# Patient Record
Sex: FEMALE | Race: BLACK OR AFRICAN AMERICAN | Employment: UNEMPLOYED | URBAN - METROPOLITAN AREA
[De-identification: names, ages, dates, MRNs, and addresses within clinical notes are randomized per-mention and may not be internally consistent; named-entity substitution may affect disease eponyms.]

---

## 2020-08-17 ENCOUNTER — VIRTUAL VISIT (OUTPATIENT)
Dept: FAMILY MEDICINE CLINIC | Age: 30
End: 2020-08-17
Payer: MEDICAID

## 2020-08-17 DIAGNOSIS — U07.1 COVID-19: Primary | ICD-10-CM

## 2020-08-17 PROCEDURE — 99442 PR PHYS/QHP TELEPHONE EVALUATION 11-20 MIN: CPT | Performed by: FAMILY MEDICINE

## 2020-08-17 RX ORDER — APIXABAN 2.5 MG/1
TABLET, FILM COATED ORAL
COMMUNITY
Start: 2020-08-02

## 2020-08-17 NOTE — PROGRESS NOTES
Patient stated name &   Chief Complaint   Patient presents with    New Patient     Covid patient        [facility] H/O f/u      Discharged 20     Treatment - Zinc, Vit C and a steroid        Health Maintenance Due   Topic    Pneumococcal 0-64 years (1 of 1 - PPSV23)    PAP AKA CERVICAL CYTOLOGY     Influenza Age 5 to Adult        Wt Readings from Last 3 Encounters:   14 313 lb (142 kg)   12 280 lb (127 kg)   12 283 lb (128.4 kg)     Temp Readings from Last 3 Encounters:   12 98.7 °F (37.1 °C)   12 98.8 °F (37.1 °C) (Oral)   10/12/11 99.2 °F (37.3 °C) (Oral)     BP Readings from Last 3 Encounters:   14 136/82   12 139/84   12 133/76     Pulse Readings from Last 3 Encounters:   14 72   12 90   12 94         Learning Assessment:  :     No flowsheet data found. Depression Screening:  :     No flowsheet data found. Fall Risk Assessment:  :     No flowsheet data found. Abuse Screening:  :     No flowsheet data found. Coordination of Care Questionnaire:  :     1) Have you been to an emergency room, urgent care clinic since your last visit? No    Hospitalized since your last visit? Yes  [facility] H/O  Positive Covid             2) Have you seen or consulted any other health care providers outside of [address] [place] [address] since your last visit? No  (Include any pap smears or colon screenings in this section.)  no    Patient is accompanied by VV I have received verbal consent from Nohemy Vega to discuss any/all medical information while they are present in the room.

## 2020-08-17 NOTE — PROGRESS NOTES
Nohemy Vega is a 26 y.o. female evaluated via telephone on 8/17/2020. Consent:  She and/or health care decision maker is aware that she may receive a bill for this telephone service, depending on her insurance coverage, and has provided verbal consent to proceed: Yes    Unable to contact patient for scheduled video visit so entire visit done on phone instead    Documentation:  I communicated with the patient and/or health care decision maker about recent hospitalization for COVID. Details of this discussion including any medical advice provided:     Hospitalized at [address] [place] [address]   4 days  DC August 2  Steroids, plasma, zinc and vitamin C used  Remains on Eliquis has one month supply  Advised may stop with onset of menses  No fever, dyspnea or other sx now  Had O2 but only needed for one day. Assessment and Plan:    1. COVID-19  Enfora . May stop with menses. Watch for hematuria or BRBPR  Bleeding risk discussed. FU if new respiratory sx otherwise in clinic PRN      Follow-up and Dispositions    · Return in about 1 month (around 9/17/2020) for COVID follow up in clinic. I affirm this is a Patient Initiated Episode with a Patient who has not had a related appointment within my department in the past 7 days or scheduled within the next 24 hours. Total Time: minutes: 11-20 minutes    Note: not billable if this call serves to triage the patient into an appointment for the relevant concern    The patient was at home and I was in office for this phone visit.     Ilana Guillaume MD

## 2022-01-06 ENCOUNTER — NURSE TRIAGE (OUTPATIENT)
Dept: OTHER | Facility: CLINIC | Age: 32
End: 2022-01-06

## 2022-01-06 NOTE — TELEPHONE ENCOUNTER
Received call from Crow at [facility] with Red Flag Complaint. Subjective: Caller states \"heart palpitations seem cardiologist years ago and the cardiologist said pts heart was fine\"     Current Symptoms: palpitations    Onset: this morning 6:30 am    Associated Symptoms: NA    Pain Severity: no pain    Temperature: no temp    What has been tried: resting, laying down, staying calm    LMP: 4 days ago Pregnant: No    Recommended disposition: to to see PCP today - pt is not established pt going to ER today. Transferred pt to  to get established appt. Care advice provided, patient verbalizes understanding; denies any other questions or concerns; instructed to call back for any new or worsening symptoms. Writer provided warm transfer to message to Leslee Rizo at [facility] for appointment scheduling    Attention Provider: Thank you for allowing me to participate in the care of your patient. The patient was connected to triage in response to information provided to the ECC. Please do not respond through this encounter as the response is not directed to a shared pool.     Reminders:     Call [address] Provider/Office    Care Advice - both instruction and documentation    Routing - PCP     PLEASE DELETE ALL RED TEXT PRIOR TO SIGNING NOTE    Reason for Disposition   Skipped or extra beat(s) and increases with exercise or exertion    Protocols used: HEART RATE AND HEARTBEAT QUESTIONS-ADULT-[place]

## 2024-03-04 ENCOUNTER — OFFICE VISIT (OUTPATIENT)
Age: 34
End: 2024-03-04
Payer: MEDICAID

## 2024-03-04 VITALS
HEIGHT: 72 IN | WEIGHT: 293 LBS | HEART RATE: 81 BPM | BODY MASS INDEX: 39.68 KG/M2 | SYSTOLIC BLOOD PRESSURE: 136 MMHG | DIASTOLIC BLOOD PRESSURE: 81 MMHG

## 2024-03-04 DIAGNOSIS — E05.90 SUBCLINICAL HYPERTHYROIDISM: Primary | ICD-10-CM

## 2024-03-04 DIAGNOSIS — E04.2 MULTIPLE THYROID NODULES: ICD-10-CM

## 2024-03-04 PROCEDURE — 99204 OFFICE O/P NEW MOD 45 MIN: CPT | Performed by: INTERNAL MEDICINE

## 2024-03-04 PROCEDURE — 76536 US EXAM OF HEAD AND NECK: CPT | Performed by: INTERNAL MEDICINE

## 2024-03-04 PROCEDURE — G2211 COMPLEX E/M VISIT ADD ON: HCPCS | Performed by: INTERNAL MEDICINE

## 2024-03-04 NOTE — PROGRESS NOTES
Chief Complaint   Patient presents with    Thyroid Problem       * New Patient Visit     General:  [facility]ch[facility] - at [facility]  Labs were elevated   This was a couple months ago    Did see someone at  in past and had FNA - many many years ago   Maybe had a scan?    Family History of thyroid disease: PGM - thyroid?    Thyroid Symptoms  denies change in energy, denies change in weight, denies change in appetite, denies sleep issues, **has thinning hair**(runs in family), no skin changes, denies sweats, denies hot/cold intolerance, denies tremor, denies palpitations, denies change in bowels, no change in menses, denies mood changes    Neck Symptoms  denies dysphagia, denies anterior neck pain, denies neck swelling, notes no nodules    Labs/Studies    Found after visit:  TSH:    FT4:    5/18/18 TSI < 0.1, Tg Ab < 0.1, TPO 12  6/22/18 TSH 0.91, FT4 1.2, FT3 2.8, TSI < 0.1  6/29/18  FNA: left -negative for malignant cells; c/w benign follicular nodule  FNA: isthmus - insufficient cells for study  6/6/18 TRAB < 0.5, TSH 0.75, FT4 1.2, TT3 109      Past Medical History:   Diagnosis Date    Eczema     HTN (hypertension)     Hypertension       Blood pressure 136/81, pulse 81, height 1.829 m (6'), weight (!) 143.8 kg (317 lb).         3/4/2024     8:51 AM   Weight Metrics   Weight 317 lb   BMI (Calculated) 43.1 kg/m2        EXAM:  - not done today     Assessment/Plan:   1. Subclinical hyperthyroidism  Says recently told labs showed hyperthyroidism  Was sick at the time  Repeat with Ab  H/o possible US and Scan at  years ago - will request  (In  found low TSH in '18, rest were normal)    2. Multiple thyroid nodules  Large nodules on left  May have had 1-2 nodules FNA in past  Requesting records from  to further evaluate  (Found FNA path in  - one benign, one insufficient)      No orders of the defined types were placed in this encounter.     Orders Placed This Encounter   Procedures    US,HEAD/NECK TISSUES,REAL TIME

## 2024-03-04 NOTE — PROGRESS NOTES
Diagnostic Thyroid Ultrasound    Date: 3/4/2024     Real time ultrasound of the thyroid gland was performed in both transverse and sagittal planes    Right Lobe:  Size: 1.12 x 1.77 x 3.42 cm  Texture: Homogeneous    Nodules: a few sub-5mm cysts/nodules      Isthmus:    Size: 0.24cm  Nodules: 2.13 x 1.32 x 2.11 cm nodule on left       Left Lobe:  Size: normal thyroid tissue 1.98 x 1.42 cm  Texture: Homogeneous     Nodules: 1 vs 2 nodules: separately 1.86 x 1.75 x 2.57, 1.87 x 1.89 x 2.88 cm (together 3.56 x 1.89 x 3.46 cm)      Impression:  Dominant nodules on left - seem like 2 separate nodule but could be 1 lobulated nodule

## 2024-03-05 LAB
FT4I SERPL CALC-MCNC: 2.1 (ref 1.2–4.9)
T3 SERPL-MCNC: 105 NG/DL (ref 71–180)
T3RU NFR SERPL: 30 % (ref 24–39)
T4 SERPL-MCNC: 7.1 UG/DL (ref 4.5–12)
THYROGLOB AB SERPL-ACNC: <1 IU/ML (ref 0–0.9)
THYROPEROXIDASE AB SERPL-ACNC: <9 IU/ML (ref 0–34)
TSH RECEP AB SER-ACNC: <1.1 IU/L (ref 0–1.75)
TSH SERPL DL<=0.005 MIU/L-ACNC: 0.5 UIU/ML (ref 0.45–4.5)

## 2024-03-06 LAB — TSI SER-ACNC: <0.1 IU/L (ref 0–0.55)

## 2024-03-28 ENCOUNTER — TELEPHONE (OUTPATIENT)
Age: 34
End: 2024-03-28

## 2024-03-28 NOTE — TELEPHONE ENCOUNTER
We requested records from [facility] and they sent US and CT but for the thyroid uptake and scan they only sent the uptake, not the scan which was important - needed to see if any toxic nodule  Please request from them (can't find the original med records request in media)